# Patient Record
Sex: FEMALE | Race: WHITE | NOT HISPANIC OR LATINO | Employment: UNEMPLOYED | URBAN - METROPOLITAN AREA
[De-identification: names, ages, dates, MRNs, and addresses within clinical notes are randomized per-mention and may not be internally consistent; named-entity substitution may affect disease eponyms.]

---

## 2018-11-08 ENCOUNTER — OFFICE VISIT (OUTPATIENT)
Dept: URGENT CARE | Age: 38
End: 2018-11-08
Payer: COMMERCIAL

## 2018-11-08 VITALS
RESPIRATION RATE: 16 BRPM | BODY MASS INDEX: 23.57 KG/M2 | TEMPERATURE: 98.4 F | DIASTOLIC BLOOD PRESSURE: 78 MMHG | HEIGHT: 63 IN | OXYGEN SATURATION: 100 % | HEART RATE: 98 BPM | WEIGHT: 133 LBS | SYSTOLIC BLOOD PRESSURE: 131 MMHG

## 2018-11-08 DIAGNOSIS — J20.8 ACUTE BRONCHITIS DUE TO OTHER SPECIFIED ORGANISMS: Primary | ICD-10-CM

## 2018-11-08 PROCEDURE — 99213 OFFICE O/P EST LOW 20 MIN: CPT | Performed by: FAMILY MEDICINE

## 2018-11-08 RX ORDER — NORGESTIMATE AND ETHINYL ESTRADIOL 7DAYSX3 28
KIT ORAL
COMMUNITY
Start: 2017-11-09

## 2018-11-08 RX ORDER — AZITHROMYCIN 250 MG/1
TABLET, FILM COATED ORAL
Qty: 6 TABLET | Refills: 0 | Status: SHIPPED | OUTPATIENT
Start: 2018-11-08 | End: 2018-11-12

## 2018-11-08 RX ORDER — BENZONATATE 100 MG/1
100 CAPSULE ORAL 3 TIMES DAILY PRN
Qty: 9 CAPSULE | Refills: 0 | Status: SHIPPED | OUTPATIENT
Start: 2018-11-08

## 2018-11-08 NOTE — PATIENT INSTRUCTIONS
Take antibiotics as prescribed  Use tessalon pearles if needed as prescribed  Stay hydrated with lots of water/fluids  Follow-up with PCP in the next few days for reexamination and to ensure resolution of symptoms  Go to the ED if any intractable fevers, unable to stay hydrated, abdominal pain, chest pain, shortness of breath, new or worsening symptoms or other concerning symptoms

## 2018-11-08 NOTE — PROGRESS NOTES
[address]        NAME: Albert Sampson is a 41 y o  female  : 1980    MRN: [id number]  DATE: 2018  TIME: 5:14 PM    Assessment and Plan   Acute bronchitis due to other specified organisms [J20 8]  1  Acute bronchitis due to other specified organisms  azithromycin (ZITHROMAX) 250 mg tablet    benzonatate (TESSALON PERLES) 100 mg capsule         Patient Instructions       Take antibiotics as prescribed  Use tessalon pearles if needed as prescribed  Stay hydrated with lots of water/fluids  Follow-up with PCP in the next few days for reexamination and to ensure resolution of symptoms  Go to the ED if any intractable fevers, unable to stay hydrated, abdominal pain, chest pain, shortness of breath, new or worsening symptoms or other concerning symptoms  Chief Complaint     Chief Complaint   Patient presents with    Cough     pt reports having had the cough for 3 weeks    Nasal Congestion     pt reports having nasal congestion the past 2 weeks but has subsided, reports mild congestion now, over weekend noticed mucus was yellowish and brown but presents clear now , had taken sudafed for few days over the weekend          History of Present Illness       Cough   This is a new problem  Episode onset: about 2 5 weeks ago  Progression since onset: was getting better then it started getting worse last week with increased phlegm/greenish yellow sputum  The problem occurs hourly  The cough is productive of sputum  Associated symptoms include nasal congestion and rhinorrhea  Pertinent negatives include no chest pain, chills, ear pain, fever, headaches, postnasal drip, rash, sore throat, shortness of breath or wheezing  Associated symptoms comments: Some muscle achiness with coughing   Nothing aggravates the symptoms  Risk factors: sick contacts with "colds", denies smoking hx  Treatments tried: sudafed   The treatment provided moderate (resolved her sinus pressure and most of the nasal congestion, but states cough is "lingering") relief  There is no history of asthma     states she has sore throat but that resolved last week  Denies any past medical history  Denies any pregnancy risk  Not breast feeding  States taking Z-packs in the past for similar bronchitis which normally helps her  Review of Systems   Review of Systems   Constitutional: Negative for chills and fever  HENT: Positive for congestion and rhinorrhea  Negative for ear pain, facial swelling, postnasal drip, sinus pain, sinus pressure, sore throat, trouble swallowing and voice change  Eyes: Negative for discharge, itching and visual disturbance  Respiratory: Positive for cough  Negative for shortness of breath and wheezing  Cardiovascular: Negative for chest pain and leg swelling  Gastrointestinal: Negative for abdominal pain, diarrhea, nausea and vomiting  Musculoskeletal: Negative for back pain and neck pain  Skin: Negative for rash  Neurological: Negative for dizziness, syncope, weakness, numbness and headaches  All other systems reviewed and are negative          Current Medications       Current Outpatient Prescriptions:     Multiple Vitamins-Minerals (THR[employer] WOMENS) TABS, Take by mouth, Disp: , Rfl:     norgestimate-ethinyl estradiol (ORTHO TRI-CYCLEN,TRINESSA) 0 18/0 215/0 25 MG-35 MCG per tablet, TAKE 1 TABLET BY MOUTH EVERY DAY, Disp: , Rfl:     azithromycin (ZITHROMAX) 250 mg tablet, Take 2 tablets today then 1 tablet daily x 4 days, Disp: 6 tablet, Rfl: 0    benzonatate (TESSALON PERLES) 100 mg capsule, Take 1 capsule (100 mg total) by mouth 3 (three) times a day as needed for cough, Disp: 9 capsule, Rfl: 0    Current Allergies     Allergies as of 11/08/2018    (No Known Allergies)            The following portions of the patient's history were reviewed and updated as appropriate: allergies, current medications, past family history, past medical history, past social history, past surgical history and problem list      Past Medical History:   Diagnosis Date    Allergic rhinitis     UTI (urinary tract infection)     Varicella        Past Surgical History:   Procedure Laterality Date    ABCESS DRAINAGE      COCCYX FRACTURE SURGERY      CYST REMOVAL      WISDOM TOOTH EXTRACTION         Family History   Problem Relation Age of Onset    Polycythemia Mother     Stroke Mother     Hypertension Mother     Stroke Father     Hypertension Father          Medications have been verified  Objective   /78 (BP Location: Right arm, Patient Position: Sitting, Cuff Size: Standard)   Pulse 98   Temp 98 4 °F (36 9 °C) (Oral)   Resp 16   Ht 5' 3" (1 6 m)   Wt 60 3 kg (133 lb)   LMP 10/23/2018 (Approximate)   SpO2 100%   Breastfeeding? No   BMI 23 56 kg/m²        Physical Exam     Physical Exam   Constitutional: She is oriented to person, place, and time  She appears well-developed and well-nourished  No distress  Nontoxic-appearing, smiling   HENT:   Head: Normocephalic and atraumatic  Right Ear: Hearing, tympanic membrane, external ear and ear canal normal  No mastoid tenderness  Left Ear: Hearing, tympanic membrane, external ear and ear canal normal  No mastoid tenderness  Mouth/Throat: Uvula is midline and mucous membranes are normal  No uvula swelling  Posterior oropharyngeal erythema present  Mild postnasal drip  No sinus tenderness to palpation   Eyes: Pupils are equal, round, and reactive to light  Conjunctivae and EOM are normal    Neck: Normal range of motion  Neck supple  Cardiovascular: Normal rate, regular rhythm and normal heart sounds  Pulmonary/Chest: Effort normal and breath sounds normal  No respiratory distress  She has no wheezes  Musculoskeletal: Normal range of motion  Neurological: She is alert and oriented to person, place, and time  Skin: Skin is warm and dry  No rash noted  Psychiatric: She has a normal mood and affect   Her behavior is normal    Nursing note and vitals reviewed